# Patient Record
Sex: MALE | Race: WHITE | NOT HISPANIC OR LATINO | URBAN - NONMETROPOLITAN AREA
[De-identification: names, ages, dates, MRNs, and addresses within clinical notes are randomized per-mention and may not be internally consistent; named-entity substitution may affect disease eponyms.]

---

## 2019-03-26 ENCOUNTER — IMPORTED ENCOUNTER (OUTPATIENT)
Dept: URBAN - NONMETROPOLITAN AREA CLINIC 1 | Facility: CLINIC | Age: 44
End: 2019-03-26

## 2019-03-26 PROCEDURE — 92310 CONTACT LENS FITTING OU: CPT

## 2019-03-26 PROCEDURE — 92014 COMPRE OPH EXAM EST PT 1/>: CPT

## 2019-03-26 PROCEDURE — 92015 DETERMINE REFRACTIVE STATE: CPT

## 2019-03-26 NOTE — PATIENT DISCUSSION
LET and mild amblyopia LE. Hyperopia-Discussed diagnosis with patient. Astigmatism-Discussed diagnosis with patient. Updated spec Rx given. Recommend lens that will provide comfort as well as protect safety and health of eyes. CL wear-CLs fit and center well.-Stressed that patient should not sleep in CL. -Updated CL Rx given. -CL care and precautions given.; 's Notes: Three children 10 12 14. All go to [employer]. Working at [employer] and also at [employer] working on his doctorate disertation for Doctor of Education degree. Also teaching chemistry lab online.

## 2021-07-06 ENCOUNTER — IMPORTED ENCOUNTER (OUTPATIENT)
Dept: URBAN - NONMETROPOLITAN AREA CLINIC 1 | Facility: CLINIC | Age: 46
End: 2021-07-06

## 2021-07-06 PROCEDURE — 92015 DETERMINE REFRACTIVE STATE: CPT

## 2021-07-06 PROCEDURE — 92014 COMPRE OPH EXAM EST PT 1/>: CPT

## 2021-07-06 PROCEDURE — 92310 CONTACT LENS FITTING OU: CPT

## 2022-04-09 ASSESSMENT — VISUAL ACUITY
OD_SC: 20/25+1
OU_SC: 20/25
OS_SC: 20/40
OS_SC: 20/40
OD_CC: J1
OU_CC: 20/25
OD_SC: 20/25+1
OS_CC: 20/60
OD_CC: 20/25
OU_SC: 20/25
OS_CC: 20/25
OS_CC: J1
OU_CC: 20/25
OD_SC: 20/25
OD_CC: 20/25

## 2022-04-09 ASSESSMENT — TONOMETRY
OS_IOP_MMHG: 15
OD_IOP_MMHG: 15
OD_IOP_MMHG: 15
OS_IOP_MMHG: 15

## 2022-09-20 ENCOUNTER — ESTABLISHED PATIENT (OUTPATIENT)
Dept: RURAL CLINIC 1 | Facility: CLINIC | Age: 47
End: 2022-09-20

## 2022-09-20 DIAGNOSIS — H50.00: ICD-10-CM

## 2022-09-20 DIAGNOSIS — H52.223: ICD-10-CM

## 2022-09-20 DIAGNOSIS — H52.03: ICD-10-CM

## 2022-09-20 PROCEDURE — 92015 DETERMINE REFRACTIVE STATE: CPT

## 2022-09-20 PROCEDURE — 92310-E CONTACT LENS FITTING ESTABLISH PATIENT

## 2022-09-20 PROCEDURE — 92014 COMPRE OPH EXAM EST PT 1/>: CPT

## 2022-09-20 ASSESSMENT — TONOMETRY
OD_IOP_MMHG: 15
OS_IOP_MMHG: 15

## 2022-09-20 ASSESSMENT — VISUAL ACUITY
OS_CC: 20/60
OD_CC: 20/30

## 2024-01-08 ENCOUNTER — ESTABLISHED PATIENT (OUTPATIENT)
Dept: RURAL CLINIC 1 | Facility: CLINIC | Age: 49
End: 2024-01-08

## 2024-01-08 DIAGNOSIS — H50.00: ICD-10-CM

## 2024-01-08 DIAGNOSIS — H52.223: ICD-10-CM

## 2024-01-08 DIAGNOSIS — H52.03: ICD-10-CM

## 2024-01-08 PROCEDURE — 92015 DETERMINE REFRACTIVE STATE: CPT

## 2024-01-08 PROCEDURE — 92014 COMPRE OPH EXAM EST PT 1/>: CPT

## 2024-01-08 PROCEDURE — 92310-E CONTACT LENS FITTING ESTABLISH PATIENT

## 2024-01-08 ASSESSMENT — VISUAL ACUITY
OD_CC: 20/20
OD_CC: 20/20
OS_CC: 20/60
OS_CC: 20/60

## 2024-01-08 ASSESSMENT — TONOMETRY
OD_IOP_MMHG: 16
OS_IOP_MMHG: 16

## 2025-05-09 ENCOUNTER — COMPREHENSIVE EXAM (OUTPATIENT)
Age: 50
End: 2025-05-09

## 2025-05-09 DIAGNOSIS — H50.00: ICD-10-CM

## 2025-05-09 DIAGNOSIS — H52.03: ICD-10-CM

## 2025-05-09 DIAGNOSIS — H25.13: ICD-10-CM

## 2025-05-09 DIAGNOSIS — H52.223: ICD-10-CM

## 2025-05-09 PROCEDURE — 92015 DETERMINE REFRACTIVE STATE: CPT

## 2025-05-09 PROCEDURE — 92310-1 LEVEL 1 SOFT LENS UPDATE

## 2025-05-09 PROCEDURE — 92014 COMPRE OPH EXAM EST PT 1/>: CPT
